# Patient Record
(demographics unavailable — no encounter records)

---

## 2024-10-31 NOTE — REASON FOR VISIT
[Initial Consultation] : an initial consultation [Hydrocele] : hydrocele [PCP] : ~pcp~ [Parents] : parents

## 2024-11-18 NOTE — HISTORY OF PRESENT ILLNESS
[TextBox_4] : GARY is here for consultation today.  He is a healthy 8-year-old child who was born at term after an unassisted conception and uneventful pregnancy.  Recently he was noted to have swelling in the scrotum. There is no associated pain or nausea/vomiting. No family history of hernias.

## 2024-11-18 NOTE — CONSULT LETTER
[FreeTextEntry1] : Dear Dr. LAYTON MAN,      I had the pleasure of consulting on GARY GRACIA today.  Below is my note regarding the office visit today.      Thank you so very much for allowing me to participate in GARY's care.  Please don't hesitate to call me should any questions or issues arise.        Sincerely,     Arturo Adrian MD, FACS, [facility]    Chief, Pediatric Urology    Professor of Urology and Pediatrics    [employer]        President, [employer] - [place] Section    Past-President, [employer]

## 2024-11-18 NOTE — PHYSICAL EXAM
[TextBox_92] :  PENIS: Straight protuberant penis.  Meatus ample size in orthotopic position.   SCROTUM: Symmetric testes in dependent position without palpable mass, hernia, hydrocele or varicocele

## 2024-11-18 NOTE — CONSULT LETTER
[FreeTextEntry1] : Dear Dr. LAYTON MAN,      I had the pleasure of consulting on GARY GRACIA today.  Below is my note regarding the office visit today.      Thank you so very much for allowing me to participate in GARY's care.  Please don't hesitate to call me should any questions or issues arise.        Sincerely,     Arturo Adrian MD, FACS, [facility]    Chief, Pediatric Urology    Professor of Urology and Pediatrics    [employer]        President, [employer] - [place] Section    Past-President, [employer] Pediatric [employer]